# Patient Record
Sex: FEMALE | Race: WHITE | NOT HISPANIC OR LATINO | URBAN - METROPOLITAN AREA
[De-identification: names, ages, dates, MRNs, and addresses within clinical notes are randomized per-mention and may not be internally consistent; named-entity substitution may affect disease eponyms.]

---

## 2018-08-09 ENCOUNTER — OFFICE VISIT - RIVER FALLS (OUTPATIENT)
Dept: FAMILY MEDICINE | Facility: CLINIC | Age: 23
End: 2018-08-09

## 2018-08-09 ASSESSMENT — MIFFLIN-ST. JEOR: SCORE: 1645.34

## 2022-02-12 VITALS
DIASTOLIC BLOOD PRESSURE: 70 MMHG | BODY MASS INDEX: 31.66 KG/M2 | TEMPERATURE: 98.2 F | HEIGHT: 66 IN | SYSTOLIC BLOOD PRESSURE: 108 MMHG | WEIGHT: 197 LBS | HEART RATE: 72 BPM

## 2022-02-16 NOTE — PROGRESS NOTES
Patient:   JAYMIE CASTANON            MRN: [id number]            FIN: [id number]               Age:   22 years     Sex:  Female     :  1995   Associated Diagnoses:   Anxiety, generalized; Asthma, mild intermittent; Family planning   Author:   Aiyana Arceo      Visit Information      Date of Service: 2018 03:34 pm  Performing Location: [facility]  Encounter#: [id number]      Primary Care Provider (PCP):  Aiyana Arceo    NPI# [id number]      Referring Provider:  Aiyana Arceo    NPI# [id number]      Chief Complaint      History of Present Illness   confirmed chief complaint with patient  She has been without insurance so not in clinic  Is getting  and moving to [facility] where her beau of 9 years is from.  She still has anxiety but keeps it curbed fairly well, she has a corgie dog that is with her most times and very soothing for her.  She is not on medication any longer.  Flying causes great anxiety and near panic, will trial benzo only before flight and landing when long flight used.   see phq 9 and DEBBY 7  Her asthma is under great control, no money to fill inhaler at this time but will take RX  Also needs to restart depo, has sued through the [facility] for about 18 months, last shot in April, has terrible ehavy periods when she is late for shot, LMP   Last IC in          Review of Systems             Health Status   Allergies:    Allergic Reactions (Selected)  No known allergies   Medications:  (Selected)   Prescriptions  Prescribed  LORazepam 0.5 mg oral tablet: See Instructions, Instructions: take 45 min before flight take off and landing if needed, # 7 tab(s), 0 Refill(s), Type: Maintenance, Pharmacy: [facility] [id number], take 45 min before flight take off and landing if needed  ProAir HFA 90 mcg/inh inhalation aerosol: 2 puff(s), INH, q4 hrs, PRN: for shortness of breath or wheezing, # 1 EA, 1 Refill(s), Type: Maintenance, 2 puff(s) inh q4  hrs,PRN:for shortness of breath or wheezing  Documented Medications  Documented  Depo-Provera Contraceptive 150 mg/mL intramuscular suspension: ( 150 mg ), im, once, 0 Refill(s), Type: Maintenance   Problem list:    All Problems  Adolescent Dysmenorrhea / ICD-9-.3 / Confirmed  Chronic Headache / ICD-9-.0 / Confirmed  Obesity / SNOMED CT 8875225996 / Probable  Asthma, mild intermittent / SNOMED CT 4325366827 / Confirmed  Anxiety, generalized / SNOMED CT 07615713 / Confirmed  Resolved: asthma  Resolved: Menarche / SNOMED CT 13099795  Canceled: Asthma / ICD-9-.90  Canceled: Anxiety / ICD-9-.00      Histories   Past Medical History:    Resolved  Menarche ([id number]): Onset in  at 11 years.  Resolved.  asthma:  Resolved.   Family History:    Diabetes mellitus  Grandmother (M)  Heart disease  Grandfather (M) ()  High blood pressure  Mother (Yas)     Procedure history:    No active procedure history items have been selected or recorded.   Social History:        Alcohol Assessment            Never      Tobacco Assessment            Never      Substance Abuse Assessment            Never      Employment and Education Assessment            Unemployed      Home and Environment Assessment            Spouse/Partner name: Dae Lomas.      Nutrition and Health Assessment            Type of diet: Regular.      Sexual Assessment            Sexually active: Yes.  Sexual orientation: Heterosexual.  Contraceptive Use Details: Birth control pill,               Condoms.      Other Assessment            Last eye exam 4/15/15; Last dental exam 2014        Physical Examination   Vital Signs   2018 3:40 PM CDT Temperature Tympanic 98.2 DegF    Peripheral Pulse Rate 72 bpm    Pulse Site Radial artery    HR Method Manual    Systolic Blood Pressure 108 mmHg    Diastolic Blood Pressure 70 mmHg    Mean Arterial Pressure 83 mmHg    BP Site Right arm    BP Method Manual      Measurements from flowsheet :  Measurements   8/9/2018 3:40 PM CDT Height Measured - Standard 66 in    Weight Measured - Standard 197.0 lb    BSA 2.04 m2    Body Mass Index 31.79 kg/m2  HI      General:  Alert and oriented, No acute distress.    Neck:  Supple, Non-tender.    Respiratory:  Lungs are clear to auscultation, Respirations are non-labored, Breath sounds are equal.    Integumentary:  Warm, Dry, Pink.    Neurologic:  Alert.    Psychiatric:  Appropriate mood & affect, Normal judgment, Non-suicidal.       Review / Management   Results review:  upt neg.       Impression and Plan   Diagnosis     Anxiety, generalized (PBI15-OW F41.1).     Asthma, mild intermittent (GNA88-LK J45.20).     Family planning (IPC97-EU Z30.09).     Patient Instructions:       Counseled: Patient, Regarding diagnosis, Regarding treatment, Regarding medications, Verbalized understanding, inhaler, encouraged to fill rx  use benzo at home as trial to make sure dose is adequate  paperwork completed for emotional support animal  15 min over 12 min in counseling.    Orders     Orders (Selected)   Prescriptions  Prescribed  LORazepam 0.5 mg oral tablet: See Instructions, Instructions: take 45 min before flight take off and landing if needed, # 7 tab(s), 0 Refill(s), Type: Maintenance, Pharmacy: [facility] [id number], take 45 min before flight take off and landing if needed  ProAir HFA 90 mcg/inh inhalation aerosol: 2 puff(s), INH, q4 hrs, PRN: for shortness of breath or wheezing, # 1 EA, 1 Refill(s), Type: Maintenance, 2 puff(s) inh q4 hrs,PRN:for shortness of breath or wheezing.